# Patient Record
Sex: FEMALE | Race: WHITE | Employment: OTHER | URBAN - METROPOLITAN AREA
[De-identification: names, ages, dates, MRNs, and addresses within clinical notes are randomized per-mention and may not be internally consistent; named-entity substitution may affect disease eponyms.]

---

## 2020-12-27 ENCOUNTER — HOSPITAL ENCOUNTER (EMERGENCY)
Facility: HOSPITAL | Age: 25
Discharge: HOME | End: 2020-12-27
Attending: PHYSICIAN ASSISTANT
Payer: COMMERCIAL

## 2020-12-27 VITALS
RESPIRATION RATE: 16 BRPM | WEIGHT: 125 LBS | HEART RATE: 72 BPM | SYSTOLIC BLOOD PRESSURE: 112 MMHG | BODY MASS INDEX: 19.62 KG/M2 | TEMPERATURE: 98 F | DIASTOLIC BLOOD PRESSURE: 72 MMHG | HEIGHT: 67 IN | OXYGEN SATURATION: 98 %

## 2020-12-27 DIAGNOSIS — S61.412A LACERATION OF LEFT HAND WITHOUT FOREIGN BODY, INITIAL ENCOUNTER: Primary | ICD-10-CM

## 2020-12-27 PROCEDURE — 99283 EMERGENCY DEPT VISIT LOW MDM: CPT

## 2020-12-27 PROCEDURE — 12001 RPR S/N/AX/GEN/TRNK 2.5CM/<: CPT

## 2020-12-27 PROCEDURE — 90471 IMMUNIZATION ADMIN: CPT

## 2020-12-27 RX ORDER — FAMOTIDINE 20 MG
TABLET ORAL
COMMUNITY

## 2020-12-28 NOTE — ED PROVIDER NOTES
Patient Seen in: [facility] AND [facility] Emergency Department    History   Patient presents with:  Laceration/Abrasion    Stated Complaint: lac    HPI    HPI:     22year old female who presents with chief complaint of left hand laceration.   Onset 1 hour prior is no tenderness to the chest wall. Respiratory: Respiratory effort was normal.  There is no rales, wheezes, or rhonchi. There is no stridor. Air entry is equal.  Cardiovascular: Regular rate and rhythm, no murmurs, gallops, rubs.   Capillary refill is b previously documented. Need for follow-up discussed with patient.       Disposition and Plan     Clinical Impression:  Laceration of left hand without foreign body, initial encounter  (primary encounter diagnosis)    Disposition:  Discharge    Follow-up:

## 2020-12-28 NOTE — ED INITIAL ASSESSMENT (HPI)
Laceration between first and second digit on left hand. Last tetanus unknown. Bleeding controlled pta.

## 2021-01-11 ENCOUNTER — OFFICE VISIT (OUTPATIENT)
Dept: FAMILY MEDICINE CLINIC | Facility: CLINIC | Age: 26
End: 2021-01-11
Payer: COMMERCIAL

## 2021-01-11 VITALS
SYSTOLIC BLOOD PRESSURE: 102 MMHG | WEIGHT: 129 LBS | BODY MASS INDEX: 20.25 KG/M2 | DIASTOLIC BLOOD PRESSURE: 64 MMHG | HEART RATE: 59 BPM | HEIGHT: 67 IN

## 2021-01-11 DIAGNOSIS — S61.012D LACERATION OF LEFT THUMB WITHOUT FOREIGN BODY WITHOUT DAMAGE TO NAIL, SUBSEQUENT ENCOUNTER: Primary | ICD-10-CM

## 2021-01-11 PROCEDURE — 3074F SYST BP LT 130 MM HG: CPT | Performed by: FAMILY MEDICINE

## 2021-01-11 PROCEDURE — 3078F DIAST BP <80 MM HG: CPT | Performed by: FAMILY MEDICINE

## 2021-01-11 PROCEDURE — 3008F BODY MASS INDEX DOCD: CPT | Performed by: FAMILY MEDICINE

## 2021-01-11 PROCEDURE — 99202 OFFICE O/P NEW SF 15 MIN: CPT | Performed by: FAMILY MEDICINE

## 2021-01-11 NOTE — PROGRESS NOTES
Patient presents with:  ER F/U: 12/27/20- Laceration of left hand without foreign body, initial encounter  Suture Removal      HPI:     Cristhian Perry is a 22year old female presents for suture removal removal. Injury occurred 14 days ago.   The patient ha

## 2021-01-12 ENCOUNTER — TELEPHONE (OUTPATIENT)
Dept: FAMILY MEDICINE CLINIC | Facility: CLINIC | Age: 26
End: 2021-01-12

## 2021-01-12 NOTE — TELEPHONE ENCOUNTER
Sutures placed to left hand 12/27/2020 in ED. Some sutures were removed at visit on Monday 1-. Patient is concerned that the would is not healing well. Wound appears to be re-opening where the suture was removed.     Sho Barba-  Patient is asking

## 2021-01-14 NOTE — TELEPHONE ENCOUNTER
Spoke with pt. No openings left for today, pt agrees to reschedule for tomorrow 1/15/21. Pt added to schedule.

## 2021-01-15 ENCOUNTER — OFFICE VISIT (OUTPATIENT)
Dept: FAMILY MEDICINE CLINIC | Facility: CLINIC | Age: 26
End: 2021-01-15
Payer: COMMERCIAL

## 2021-01-15 VITALS
BODY MASS INDEX: 20.25 KG/M2 | WEIGHT: 129 LBS | HEIGHT: 67 IN | DIASTOLIC BLOOD PRESSURE: 54 MMHG | SYSTOLIC BLOOD PRESSURE: 97 MMHG | HEART RATE: 71 BPM

## 2021-01-15 DIAGNOSIS — S61.012S LACERATION OF LEFT THUMB WITHOUT FOREIGN BODY WITHOUT DAMAGE TO NAIL, SEQUELA: Primary | ICD-10-CM

## 2021-01-15 PROCEDURE — 3008F BODY MASS INDEX DOCD: CPT | Performed by: FAMILY MEDICINE

## 2021-01-15 PROCEDURE — 3074F SYST BP LT 130 MM HG: CPT | Performed by: FAMILY MEDICINE

## 2021-01-15 PROCEDURE — 3078F DIAST BP <80 MM HG: CPT | Performed by: FAMILY MEDICINE

## 2021-01-15 PROCEDURE — 99213 OFFICE O/P EST LOW 20 MIN: CPT | Performed by: FAMILY MEDICINE

## 2021-01-15 NOTE — PROGRESS NOTES
Jacinto Hernández is a 22year old female.   HPI:   Patient is here to follow-up for appointment early this week, after sutures were removed when she at the [facility] that finger she noted separation of laceration, she has not had any pain and no bleeding noted, sh

## 2021-01-22 ENCOUNTER — OFFICE VISIT (OUTPATIENT)
Dept: FAMILY MEDICINE CLINIC | Facility: CLINIC | Age: 26
End: 2021-01-22
Payer: COMMERCIAL

## 2021-01-22 VITALS
HEIGHT: 67 IN | BODY MASS INDEX: 20.25 KG/M2 | WEIGHT: 129 LBS | SYSTOLIC BLOOD PRESSURE: 110 MMHG | DIASTOLIC BLOOD PRESSURE: 59 MMHG | HEART RATE: 76 BPM

## 2021-01-22 DIAGNOSIS — S61.012S LACERATION OF LEFT THUMB WITHOUT FOREIGN BODY WITHOUT DAMAGE TO NAIL, SEQUELA: ICD-10-CM

## 2021-01-22 DIAGNOSIS — L90.5 SCAR OF FINGER: Primary | ICD-10-CM

## 2021-01-22 PROCEDURE — 3008F BODY MASS INDEX DOCD: CPT | Performed by: FAMILY MEDICINE

## 2021-01-22 PROCEDURE — 3074F SYST BP LT 130 MM HG: CPT | Performed by: FAMILY MEDICINE

## 2021-01-22 PROCEDURE — 99213 OFFICE O/P EST LOW 20 MIN: CPT | Performed by: FAMILY MEDICINE

## 2021-01-22 PROCEDURE — 3078F DIAST BP <80 MM HG: CPT | Performed by: FAMILY MEDICINE

## 2021-01-22 RX ORDER — DIMETHICONE 2 %
1 CREAM (GRAM) TOPICAL DAILY
Qty: 28 G | Refills: 0 | Status: SHIPPED | OUTPATIENT
Start: 2021-01-22

## 2021-01-22 NOTE — PROGRESS NOTES
Linda Lewis is a 22year old female. HPI:   Patient here to follow-up in wound, Steri-Strip fell off 2 days ago, she denies any discomfort, lesion appeared to be healing well.   Current Outpatient Medications   Medication Sig D

## 2021-01-22 NOTE — PATIENT INSTRUCTIONS
Scar care      If you have a cut or scar, you can:    ?Keep the cut or wound moist while it heals – You can put a thin layer of petroleum jelly on it. Cover it with a bandage or gauze. Keep the bandage clean and dry. ? Cover scars when you are in the sun

## 2021-02-02 ENCOUNTER — HOSPITAL ENCOUNTER (OUTPATIENT)
Age: 26
Discharge: HOME | End: 2021-02-02
Attending: PHYSICIAN ASSISTANT
Payer: COMMERCIAL

## 2021-02-02 ENCOUNTER — APPOINTMENT (OUTPATIENT)
Dept: GENERAL RADIOLOGY | Age: 26
End: 2021-02-02
Attending: PHYSICIAN ASSISTANT
Payer: COMMERCIAL

## 2021-02-02 VITALS
TEMPERATURE: 99 F | WEIGHT: 125 LBS | HEIGHT: 67 IN | BODY MASS INDEX: 19.62 KG/M2 | RESPIRATION RATE: 20 BRPM | DIASTOLIC BLOOD PRESSURE: 71 MMHG | OXYGEN SATURATION: 100 % | HEART RATE: 72 BPM | SYSTOLIC BLOOD PRESSURE: 113 MMHG

## 2021-02-02 DIAGNOSIS — S80.02XA CONTUSION OF LEFT KNEE, INITIAL ENCOUNTER: ICD-10-CM

## 2021-02-02 DIAGNOSIS — S93.602A FOOT SPRAIN, LEFT, INITIAL ENCOUNTER: Primary | ICD-10-CM

## 2021-02-02 PROCEDURE — 99213 OFFICE O/P EST LOW 20 MIN: CPT

## 2021-02-02 PROCEDURE — 73560 X-RAY EXAM OF KNEE 1 OR 2: CPT | Performed by: PHYSICIAN ASSISTANT

## 2021-02-02 PROCEDURE — 99214 OFFICE O/P EST MOD 30 MIN: CPT

## 2021-02-02 PROCEDURE — 73630 X-RAY EXAM OF FOOT: CPT | Performed by: PHYSICIAN ASSISTANT

## 2021-02-02 RX ORDER — IBUPROFEN 600 MG/1
TABLET ORAL
Qty: 20 TABLET | Refills: 0 | Status: SHIPPED | OUTPATIENT
Start: 2021-02-02

## 2021-02-03 NOTE — ED PROVIDER NOTES
Patient Seen in: [facility]    History   Patient presents with:  Leg or Foot Injury    Stated Complaint: fell pain on left foot    HPI    HPI: Leanne Larose is a 22year old female who presents with chief complaint of left foot pain.   Onset ED Triage Vitals [02/02/21 1939]   /71   Pulse 72   Resp 20   Temp 98.5 °F (36.9 °C)   Temp src Temporal   SpO2 100 %   O2 Device None (Room air)       Current:/71   Pulse 72   Temp 98.5 °F (36.9 °C) (Temporal)   Resp 20   Ht 170.2 cm (5' 7 No open wounds. No compartment syndrome. No edema. Remainder of musculoskeletal system is grossly intact. There is no obvious deformity. Neurological: Gross motor movement is intact in all 4 extremities. Patient exhibits normal speech.   Skin: Skin is sprain, left, initial encounter  (primary encounter diagnosis)  Contusion of left knee, initial encounter    Disposition:  Discharge    Follow-up:  Slime Mei MD  [address]  [phone]    Schedule an appointme

## 2021-04-07 LAB
AMB EXT HEMATOCRIT: 40.5
AMB EXT HEMOGLOBIN: 13.7
AMB EXT MCV: 88.8
AMB EXT PLATELETS: 273
HEPATITIS B SURFACE ANTIGEN OB: NEGATIVE
HIV RESULT OB: NEGATIVE
RAPID PLASMA REAGIN OB: NONREACTIVE
RH FACTOR OB: POSITIVE

## 2021-07-07 ENCOUNTER — HOSPITAL ENCOUNTER (OUTPATIENT)
Facility: HOSPITAL | Age: 26
Setting detail: OBSERVATION
Discharge: HOME | End: 2021-07-07
Attending: OBSTETRICS & GYNECOLOGY | Admitting: OBSTETRICS & GYNECOLOGY
Payer: COMMERCIAL

## 2021-07-07 VITALS
RESPIRATION RATE: 16 BRPM | DIASTOLIC BLOOD PRESSURE: 59 MMHG | HEART RATE: 84 BPM | TEMPERATURE: 98 F | SYSTOLIC BLOOD PRESSURE: 118 MMHG

## 2021-07-07 PROBLEM — Z34.90 PREGNANCY (HCC): Status: ACTIVE | Noted: 2021-07-07

## 2021-07-07 PROBLEM — Z34.90 PREGNANCY: Status: ACTIVE | Noted: 2021-07-07

## 2021-07-07 PROCEDURE — 99213 OFFICE O/P EST LOW 20 MIN: CPT

## 2021-07-08 ENCOUNTER — NURSE TRIAGE (OUTPATIENT)
Dept: FAMILY MEDICINE CLINIC | Facility: CLINIC | Age: 26
End: 2021-07-08

## 2021-07-08 ENCOUNTER — HOSPITAL ENCOUNTER (EMERGENCY)
Facility: HOSPITAL | Age: 26
Discharge: HOME | End: 2021-07-08
Payer: COMMERCIAL

## 2021-07-08 VITALS
SYSTOLIC BLOOD PRESSURE: 106 MMHG | HEART RATE: 72 BPM | OXYGEN SATURATION: 98 % | TEMPERATURE: 98 F | DIASTOLIC BLOOD PRESSURE: 69 MMHG | RESPIRATION RATE: 16 BRPM
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
## 2021-07-08 PROCEDURE — 99283 EMERGENCY DEPT VISIT LOW MDM: CPT
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
## 2021-07-08 NOTE — TRIAGE
[facility] HOSP - [facility]      Triage Note    Kirk Guadarrama Patient Status:  Observation    1995 MRN [id number]   Location [address]  Attending Christiana Damon MD   Hosp Day # 0 PCP Lucia Beltran.  MD Michelle Giordano pt, none palpated by RN. Pt discharged home in stable condition accompanied by SO w/ written and verbal labor precautions. Pt and SO verbalized understanding of information provided, pt has f/u appt tomorrow w/ CNM.       Adam Brenner RN  7/7/2021 9:44 PM

## 2021-07-08 NOTE — ED QUICK NOTES
Patient notes pain to neck and shoulders, and headache at base of skull. Denies c-spine tenderness. No LOC, Aox4. Denies any other complaints.
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
## 2021-07-08 NOTE — PROGRESS NOTES
Call to Mckinley Cheatham CNM requesting pt prenatal records. Lian Amaro will fax prenatal records to [facility].
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
## 2024-10-20 NOTE — H&P
Name: Nayana Erickson  Date: 10/21/2024    Referring Physician: No ref. provider found    Chief Complaint   Patient presents with    Consult     Thyroid nodule dx 2015   Re establish care        HISTORY OF PRESENT ILLNESS   Nayana Erickson is a 29 year old female who presents for   Chief Complaint   Patient presents with    Consult     Thyroid nodule dx 2015   Re establish care      This is a 29 year-old woman here to establish care for a thyroid nodule that was first discovered in 2015. She had an FNA done and this was benign. She has had thyroid function tests since then that have been normal.       Family history of thyroid cancer/ conditions- mother with nodule, and underwent total thyroidectomy and result was benign  Personal history of radiation to the head or neck- none    Medical History:   Past Medical History:    Anxiety    no meds; no therapy    Thyroid nodule       Surgical History:   No past surgical history on file.    Family History:  Family History   Problem Relation Age of Onset    Lipids Father     Thyroid disease Mother        Social History:   Social History     Socioeconomic History    Marital status:    Tobacco Use    Smoking status: Never    Smokeless tobacco: Never   Vaping Use    Vaping status: Never Used   Substance and Sexual Activity    Alcohol use: Yes     Alcohol/week: 1.0 - 2.0 standard drink of alcohol     Types: 1 - 2 Standard drinks or equivalent per week    Drug use: Never       Medications: she is not on any of these medications except for seasonal allergy pills and a multi-vitamin    Current Outpatient Medications:     ibuprofen 600 MG Oral Tab, Take 1 tablet (600 mg total) by mouth every 6 hours with food, Disp: 20 tablet, Rfl: 0    Dimethicone (MEDERMA PM) 2 % External Cream, Apply 1 Application topically daily., Disp: 28 g, Rfl: 0    Prenatal Vit-Fe Fumarate-FA (PRENATAL COMPLETE) 14-0.4 MG Oral Tab, Take by mouth., Disp: , Rfl:      Allergies:    Allergies[1]    REVIEW OF SYSTEMS  Eyes: no change in vision  Neurologic: no headache, generalized or focal weakness or numbness.  Head: normal  ENT: normal  Lungs: no shortness of breath, wheezing or BETHEA  Cardiovascular:  no chest pain or palpitations  Gastrointestinal:  no abdominal pain, bowel movement problems  Musculoskeletal: no muscle pain or arthralgia  /Gyne: no frequency or discomfort while urinating  Psychiatric:  no acute distress, anxiety  or depression  Skin: normal moisturized skin    PHYSICAL EXAMINATION:  /72   Pulse 70   Ht 5' 7\" (1.702 m)   Wt 145 lb 12.8 oz (66.1 kg)   BMI 22.84 kg/m²     General Appearance:  Alert, in no acute distress, well developed  Eyes: normal conjunctivae, sclera.  Ears/Nose/Mouth/Throat/Neck:  normal hearing, normal speech and enlarged thyroid gland  Psychiatric:  oriented to time, self, and place  Nutritional:  no abnormal weight gain or loss    Labs:  No results found for: \"T4F\", \"TSH\", \"TSHT4\"    Imaging:  Not available      ASSESSMENT/PLAN: This is a 29 year-old female patient here for evaluation and management of a thyroid nodule discovered many many years ago. We discussed that thyroid nodules have a common occurrence in the population of approximately 50-60%. Of these, the risk of malignancy is approx 3-5%, and 95-97% of nodules are benign. We discussed that based upon appearance, size, risk factors, and sometimes age, we make recommendations upon whether to perform FNA biopsy of these nodules. We discussed that if nodule is benign we then plan to follow with yearly thyroid ultrasound.     - Check thyroid US  - Check TSH and FT4    I will follow up with patient once test results are back    Return to clinic in 1 year    Prior to this encounter, I spent over 20 minutes with preparing for the visit, reviewing documents from other specialties as well as from PCP, and going over test results and imaging studies. During the face to face encounter, I spent  an additional 30 minutes which were determined for history-taking, physical examination, and orientation regarding our services. Greater than 50% of the time was spent in counseling, anticipatory guidance, and coordination of care. Patient concerns were answered to the best of my knowledge.           10/21/2024  Kami Cesar MD         [1] No Known Allergies

## 2024-10-21 ENCOUNTER — LAB ENCOUNTER (OUTPATIENT)
Dept: LAB | Facility: REFERENCE LAB | Age: 29
End: 2024-10-21
Attending: INTERNAL MEDICINE
Payer: COMMERCIAL

## 2024-10-21 ENCOUNTER — OFFICE VISIT (OUTPATIENT)
Dept: ENDOCRINOLOGY CLINIC | Facility: CLINIC | Age: 29
End: 2024-10-21

## 2024-10-21 VITALS
HEART RATE: 70 BPM | BODY MASS INDEX: 22.89 KG/M2 | SYSTOLIC BLOOD PRESSURE: 115 MMHG | WEIGHT: 145.81 LBS | DIASTOLIC BLOOD PRESSURE: 72 MMHG | HEIGHT: 67 IN

## 2024-10-21 DIAGNOSIS — Z13.9 SCREENING DUE: ICD-10-CM

## 2024-10-21 DIAGNOSIS — E04.1 THYROID NODULE: Primary | ICD-10-CM

## 2024-10-21 DIAGNOSIS — E04.1 THYROID NODULE: ICD-10-CM

## 2024-10-21 LAB
T4 FREE SERPL-MCNC: 1 NG/DL (ref 0.8–1.7)
TSI SER-ACNC: 0.73 MIU/ML (ref 0.55–4.78)
VIT D+METAB SERPL-MCNC: 30.6 NG/ML (ref 30–100)

## 2024-10-21 PROCEDURE — 99244 OFF/OP CNSLTJ NEW/EST MOD 40: CPT | Performed by: INTERNAL MEDICINE

## 2024-10-21 PROCEDURE — 84443 ASSAY THYROID STIM HORMONE: CPT

## 2024-10-21 PROCEDURE — 36415 COLL VENOUS BLD VENIPUNCTURE: CPT

## 2024-10-21 PROCEDURE — 82306 VITAMIN D 25 HYDROXY: CPT

## 2024-10-21 PROCEDURE — 84439 ASSAY OF FREE THYROXINE: CPT

## 2024-11-11 ENCOUNTER — HOSPITAL ENCOUNTER (OUTPATIENT)
Dept: ULTRASOUND IMAGING | Age: 29
Discharge: HOME | End: 2024-11-11
Attending: INTERNAL MEDICINE
Payer: COMMERCIAL

## 2024-11-11 DIAGNOSIS — E04.1 THYROID NODULE: ICD-10-CM

## 2024-11-11 PROCEDURE — 76536 US EXAM OF HEAD AND NECK: CPT | Performed by: INTERNAL MEDICINE

## 2024-11-12 ENCOUNTER — PATIENT MESSAGE (OUTPATIENT)
Dept: ENDOCRINOLOGY CLINIC | Facility: CLINIC | Age: 29
End: 2024-11-12

## 2024-11-22 ENCOUNTER — TELEPHONE (OUTPATIENT)
Dept: ENDOCRINOLOGY CLINIC | Facility: CLINIC | Age: 29
End: 2024-11-22

## 2024-11-22 NOTE — TELEPHONE ENCOUNTER
Patient calling for ultra sound test results completed on 11/1. Please call at [phone],thanks.

## 2024-12-11 ENCOUNTER — PATIENT MESSAGE (OUTPATIENT)
Dept: ENDOCRINOLOGY CLINIC | Facility: CLINIC | Age: 29
End: 2024-12-11

## 2024-12-11 DIAGNOSIS — E04.2 MULTINODULAR GOITER: Primary | ICD-10-CM

## 2024-12-20 NOTE — TELEPHONE ENCOUNTER
Hi!    Please let patient know that I have reviewed the records that she has sent and compared them to the US that she had done recently, and we do see that there is once more nodule than there was before.    The size has also increased slightly, and this most likely due to cystic degeneration (meaning that the substance of the nodule has become liquid).     My recommendation would be to order a repeat thyroid US for May instead of November. I would like to see her in clinic at that time.     Thank you!